# Patient Record
Sex: FEMALE | Race: WHITE | Employment: PART TIME | URBAN - METROPOLITAN AREA
[De-identification: names, ages, dates, MRNs, and addresses within clinical notes are randomized per-mention and may not be internally consistent; named-entity substitution may affect disease eponyms.]

---

## 2018-11-19 ENCOUNTER — HOSPITAL ENCOUNTER (OUTPATIENT)
Dept: OCCUPATIONAL THERAPY | Age: 63
Setting detail: THERAPIES SERIES
Discharge: HOME | End: 2018-11-19
Payer: MEDICARE

## 2018-11-19 PROCEDURE — G8984 CARRY CURRENT STATUS: HCPCS

## 2018-11-19 PROCEDURE — 97110 THERAPEUTIC EXERCISES: CPT

## 2018-11-19 PROCEDURE — 97165 OT EVAL LOW COMPLEX 30 MIN: CPT

## 2018-11-19 PROCEDURE — G8985 CARRY GOAL STATUS: HCPCS

## 2018-11-19 NOTE — FLOWSHEET NOTE
[x][facility]      [facility] [address]       [facility], [place] [address], [address], [address]       [address]     [phone] [phone] [phone]  ________________________________________________________________________  Occupational Therapy Daily Treatment Note    Date:  2018  Patient Name:  Vianey Pond    :  1955  Restrictions/Precautions:  GENERAL  Diagnosis:    L CTS  Treatment Diagnosis:   hand pain  Insurance/Certification information: [employer] Medicare   Referring Physician:   Dr Myke Dobbins of care signed (Y/N):    Visit# / total visits:-  Pain level: 4/10     Subjective:   Prior Level of Function:  Full functional use of L hand until symptoms started in July  Patient Goals: Return to PLOF    Treatment Flowsheet   Right Left     Changed bulky dressing   x-incision clean and dry. Light dressing applied.  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      AROM wrist and hand  x    nerve gliding  x   Tendon gliding  x   Instructed in HEP and issued handouts  x                                                                                                       Interventions/Modalities used:  [x] Therapeutic Exercise   [x] Modalities: prn  [x] Therapeutic Activity    [] Ultrasound [] Elec Stimulation   [] Total Motion Release    [] Fluido [] Kinesiotaping  [] Neuromuscular Re-education   [] Ionto [] Coldpack/hotpack   [x] Instruction in HEP    Other: scar management and edema control    Objective Findings: See eval    Communication with other providers: POC to physician    Education provided to patient: Issued and instructed in HEP    Adverse Reactions to treatment: none    Timed Code Treatment Minutes:  15    Total Treatment Minutes:  30    Treatment/Activity Tolerance:     [x]  Patient tolerated treatment well []  Patient limited by

## 2018-11-19 NOTE — PLAN OF CARE
follow thru and be independent with HEP          Scar management  Edema control   Pt will decrease quick dasy of 79.4    Electronically signed by:  Miguel Briseno CHT 11/19/2018, 6:46 PM    If you have any questions or concerns, please don't hesitate to call.   Thank you for your referral.      Physician Signature:__________________   Date:_____________ Time: _______  By signing above, therapists plan is approved by physician

## 2018-11-21 ENCOUNTER — HOSPITAL ENCOUNTER (OUTPATIENT)
Dept: OCCUPATIONAL THERAPY | Age: 63
Setting detail: THERAPIES SERIES
Discharge: HOME | End: 2018-11-21
Payer: MEDICARE

## 2018-11-21 PROCEDURE — 97530 THERAPEUTIC ACTIVITIES: CPT

## 2018-11-21 PROCEDURE — 97110 THERAPEUTIC EXERCISES: CPT

## 2018-11-26 ENCOUNTER — HOSPITAL ENCOUNTER (OUTPATIENT)
Dept: OCCUPATIONAL THERAPY | Age: 63
Setting detail: THERAPIES SERIES
Discharge: HOME | End: 2018-11-26
Payer: MEDICARE

## 2018-11-26 PROCEDURE — 97530 THERAPEUTIC ACTIVITIES: CPT

## 2018-11-26 PROCEDURE — 97110 THERAPEUTIC EXERCISES: CPT

## 2018-11-30 ENCOUNTER — HOSPITAL ENCOUNTER (OUTPATIENT)
Dept: OCCUPATIONAL THERAPY | Age: 63
Setting detail: THERAPIES SERIES
Discharge: HOME | End: 2018-11-30
Payer: MEDICARE

## 2018-11-30 PROCEDURE — 97530 THERAPEUTIC ACTIVITIES: CPT

## 2018-11-30 PROCEDURE — 97110 THERAPEUTIC EXERCISES: CPT

## 2018-11-30 NOTE — FLOWSHEET NOTE
well []  Patient limited by fatique    []  Patient limited by pain []  Patient limited by other medical complications   []  Other:     G-Code  OT G-codes  Functional Assessment Tool Used: quick dash  Score: 79.54  Functional Limitation: Carrying, moving and handling objects  Carrying, Moving and Handling Objects Current Status ():  At least 80 percent but less than 100 percent impaired, limited or restricted  Carrying, Moving and Handling Objects Goal Status (): 0 percent impaired, limited or restricted  OutComes Score  Quick dash 79.54  Goals   Pt will decrease pain 0-1/10 to increase functional activity tolerance   Pt will increase AROM L wrist to WNL to increase functional  Mobility           Pt will follow thru and be independent with HEP          Scar management  Edema control   Pt will decrease quick dasy of 79.4    Patient Requires Follow-up:  [x]  Yes  []  No    Plan: [x]  Continue per plan of care []  Alter current plan (see comments)   [x]  Plan of care initiated []  Hold pending MD visit []  Discharge    Plan for Next Session:      Electronically signed by:  NIESHA Messer/L, 11/30/2018, 5:33 PM

## 2018-12-03 ENCOUNTER — HOSPITAL ENCOUNTER (OUTPATIENT)
Dept: OCCUPATIONAL THERAPY | Age: 63
Setting detail: THERAPIES SERIES
Discharge: HOME | End: 2018-12-03
Payer: MEDICARE

## 2018-12-03 PROCEDURE — 97035 APP MDLTY 1+ULTRASOUND EA 15: CPT

## 2018-12-03 PROCEDURE — 97140 MANUAL THERAPY 1/> REGIONS: CPT

## 2018-12-03 PROCEDURE — 97110 THERAPEUTIC EXERCISES: CPT

## 2018-12-05 ENCOUNTER — HOSPITAL ENCOUNTER (OUTPATIENT)
Dept: OCCUPATIONAL THERAPY | Age: 63
Setting detail: THERAPIES SERIES
Discharge: HOME | End: 2018-12-05
Payer: MEDICARE

## 2018-12-05 PROCEDURE — 97022 WHIRLPOOL THERAPY: CPT

## 2018-12-05 PROCEDURE — 97140 MANUAL THERAPY 1/> REGIONS: CPT

## 2018-12-05 PROCEDURE — 97110 THERAPEUTIC EXERCISES: CPT

## 2018-12-05 NOTE — FLOWSHEET NOTE
[x][facility] [address]      [facility] [address]       [facility] [address], [address], [address]       [address]     [phone] [phone]  ________________________________________________________________________  Occupational Therapy Daily Treatment Note    Date:  2018  Patient Name:  Domenica Freeman    :  1955  Restrictions/Precautions:  GENERAL  Diagnosis:    L CTS  Treatment Diagnosis:   hand pain  Insurance/Certification information: [employer] Medicare   Referring Physician:   Dr Tiarra De Paz of care signed (Y/N):  Y  Visit# / total visits:-  Pain level: 4/10     Subjective: Pt states is feeling ok. Still sore in palm. Prior Level of Function:  Full functional use of L hand until symptoms started in July  Patient Goals: Return to PLOF    Treatment Flowsheet   Right Left   Removed sutures. Bandaids applied  Incision clean and dry. AROM wrist and hand  x    nerve gliding  x   Tendon gliding  x   Instructed in HEP and issued handouts  x          US 1.0 W/CM2 at 3.3 MHZ  x          3# digiflex    x           2# pinch pin  x            1# wrist flex and ext  x                                               Interventions/Modalities used:  [x] Therapeutic Exercise   [x] Modalities: prn  [x] Therapeutic Activity    [x] Ultrasound [] Elec Stimulation   [] Total Motion Release    [] Fluido [] Kinesiotaping  [] Neuromuscular Re-education   [] Ionto [] Coldpack/hotpack   [x] Instruction in HEP    Other: scar management and edema control    Objective Findings: Finger ROM WFL. States wrist and palm feel more stiff than fingers.     Communication with other providers: POC to physician    Education provided to patient: Issued and instructed in HEP    Adverse Reactions to treatment: none    Timed Code Treatment Minutes:  40    Total Treatment Minutes:

## 2018-12-11 ENCOUNTER — HOSPITAL ENCOUNTER (OUTPATIENT)
Dept: OCCUPATIONAL THERAPY | Age: 63
Setting detail: THERAPIES SERIES
Discharge: HOME | End: 2018-12-11
Payer: MEDICARE

## 2018-12-11 PROCEDURE — 97140 MANUAL THERAPY 1/> REGIONS: CPT

## 2018-12-11 PROCEDURE — 97035 APP MDLTY 1+ULTRASOUND EA 15: CPT

## 2018-12-11 PROCEDURE — 97110 THERAPEUTIC EXERCISES: CPT

## 2018-12-14 ENCOUNTER — HOSPITAL ENCOUNTER (OUTPATIENT)
Dept: OCCUPATIONAL THERAPY | Age: 63
Setting detail: THERAPIES SERIES
Discharge: HOME | End: 2018-12-14
Payer: MEDICARE

## 2018-12-14 PROCEDURE — 97140 MANUAL THERAPY 1/> REGIONS: CPT

## 2018-12-14 PROCEDURE — 97035 APP MDLTY 1+ULTRASOUND EA 15: CPT

## 2018-12-14 PROCEDURE — 97110 THERAPEUTIC EXERCISES: CPT

## 2018-12-14 NOTE — FLOWSHEET NOTE
[x][facility] [address] Merr[address]      [facility] [address]       [facility], [address], [address]       [facility] [address]     [phone] [phone] [phone]  ________________________________________________________________________  Occupational Therapy Daily Treatment Note    Date:  2018  Patient Name:  Fernando Johnson    :  1955  Restrictions/Precautions:  GENERAL  Diagnosis:    L CTS  Treatment Diagnosis:   hand pain  Insurance/Certification information: [employer] Medicare   Referring Physician:   Dr Caitlin Roman of care signed (Y/N):  Y  Visit# / total visits:-  Pain level: 4/10     Subjective: Pt states is feeling ok. Still sore in palm on hypothenar area. Prior Level of Function:  Full functional use of L hand until symptoms started in July  Patient Goals: Return to PLOF    Treatment Flowsheet   Right Left   Removed sutures. Bandaids applied  Incision clean and dry. AROM wrist and hand  x    nerve gliding  x   Tendon gliding  x   Instructed in HEP and issued handouts  x          US 1.0 W/CM2 at 3.3 MHZ  x          3# digiflex    x           2# pinch pin  x            1# wrist flex and ext  x                                               Interventions/Modalities used:  [x] Therapeutic Exercise   [x] Modalities: prn  [x] Therapeutic Activity    [x] Ultrasound [] Elec Stimulation   [] Total Motion Release    [] Fluido [] Kinesiotaping  [] Neuromuscular Re-education   [] Ionto [] Coldpack/hotpack   [x] Instruction in HEP    Other: scar management and edema control    Objective Findings: Finger ROM WFL. States wrist and palm feel more stiff than fingers.   L 20#  R  50    Communication with other providers: POC to physician    Education provided to patient: Issued and instructed in HEP    Adverse Reactions to treatment: none    Timed Code Treatment Minutes:  35    Total

## 2018-12-19 ENCOUNTER — HOSPITAL ENCOUNTER (OUTPATIENT)
Dept: OCCUPATIONAL THERAPY | Age: 63
Setting detail: THERAPIES SERIES
Discharge: HOME | End: 2018-12-19
Payer: MEDICARE

## 2018-12-19 PROCEDURE — 97140 MANUAL THERAPY 1/> REGIONS: CPT

## 2018-12-19 PROCEDURE — 97035 APP MDLTY 1+ULTRASOUND EA 15: CPT

## 2018-12-19 PROCEDURE — 97110 THERAPEUTIC EXERCISES: CPT

## 2018-12-28 ENCOUNTER — HOSPITAL ENCOUNTER (OUTPATIENT)
Dept: OCCUPATIONAL THERAPY | Age: 63
Setting detail: THERAPIES SERIES
Discharge: HOME | End: 2018-12-28
Payer: MEDICARE

## 2018-12-28 PROCEDURE — 97140 MANUAL THERAPY 1/> REGIONS: CPT

## 2018-12-28 PROCEDURE — 97110 THERAPEUTIC EXERCISES: CPT

## 2018-12-28 PROCEDURE — 97035 APP MDLTY 1+ULTRASOUND EA 15: CPT

## 2019-01-03 ENCOUNTER — HOSPITAL ENCOUNTER (OUTPATIENT)
Dept: OCCUPATIONAL THERAPY | Age: 64
Setting detail: THERAPIES SERIES
Discharge: HOME | End: 2019-01-03
Payer: MEDICARE

## 2019-01-03 PROCEDURE — 97140 MANUAL THERAPY 1/> REGIONS: CPT

## 2019-01-03 PROCEDURE — 97035 APP MDLTY 1+ULTRASOUND EA 15: CPT

## 2019-01-03 PROCEDURE — 97110 THERAPEUTIC EXERCISES: CPT

## 2023-08-15 ENCOUNTER — PROCEDURE VISIT (OUTPATIENT)
Dept: CARDIOLOGY CLINIC | Age: 68
End: 2023-08-15
Payer: MEDICARE

## 2023-08-15 DIAGNOSIS — R01.1 HEART MURMUR: ICD-10-CM

## 2023-08-15 DIAGNOSIS — R01.1 CARDIAC MURMUR: Primary | ICD-10-CM

## 2023-08-15 LAB
LV EF: 58 %
LVEF MODALITY: NORMAL

## 2023-08-15 PROCEDURE — 93306 TTE W/DOPPLER COMPLETE: CPT | Performed by: INTERNAL MEDICINE

## 2023-08-18 ENCOUNTER — TELEPHONE (OUTPATIENT)
Dept: CARDIOLOGY CLINIC | Age: 68
End: 2023-08-18

## 2023-08-18 NOTE — TELEPHONE ENCOUNTER
Advised patient of Echo results, Patient advised and voices understanding. Left ventricular function and size is normal, EF is estimated at 55-60%. Mild left ventricular hypertrophy. Grade I diastolic dysfunction. No regional wall motion abnormalities were detected. Moderately sclerotic aortic valve with mild aortic stenosis, mean gradient   of 13mmHg, ROBBIE 1.46cm2. Mild to Moderate mitral, moderate aortic and mild tricuspid regurgitation is   present. RVSP is 30 mmHg. No evidence of pericardial effusion.    magy in one year